# Patient Record
Sex: MALE | Race: WHITE | HISPANIC OR LATINO | Employment: FULL TIME | URBAN - METROPOLITAN AREA
[De-identification: names, ages, dates, MRNs, and addresses within clinical notes are randomized per-mention and may not be internally consistent; named-entity substitution may affect disease eponyms.]

---

## 2022-05-04 ASSESSMENT — ENCOUNTER SYMPTOMS
DIARRHEA: 0
FREQUENCY: 0
SORE THROAT: 0
HEMATURIA: 0
HEADACHES: 0
ABDOMINAL PAIN: 0
WEAKNESS: 0
CONSTIPATION: 0
EYE PAIN: 0
JOINT SWELLING: 0
SHORTNESS OF BREATH: 0
PARESTHESIAS: 0
MYALGIAS: 0
HEMATOCHEZIA: 0
DYSURIA: 0
PALPITATIONS: 0
ARTHRALGIAS: 0
DIZZINESS: 0
CHILLS: 0
COUGH: 0
HEARTBURN: 0
NERVOUS/ANXIOUS: 0
NAUSEA: 0
FEVER: 0

## 2022-05-05 ENCOUNTER — OFFICE VISIT (OUTPATIENT)
Dept: INTERNAL MEDICINE | Facility: CLINIC | Age: 42
End: 2022-05-05
Payer: COMMERCIAL

## 2022-05-05 VITALS
HEIGHT: 67 IN | RESPIRATION RATE: 20 BRPM | TEMPERATURE: 98.2 F | OXYGEN SATURATION: 97 % | DIASTOLIC BLOOD PRESSURE: 79 MMHG | HEART RATE: 74 BPM | BODY MASS INDEX: 38.92 KG/M2 | SYSTOLIC BLOOD PRESSURE: 114 MMHG | WEIGHT: 248 LBS

## 2022-05-05 DIAGNOSIS — Z11.4 SCREENING FOR HIV (HUMAN IMMUNODEFICIENCY VIRUS): ICD-10-CM

## 2022-05-05 DIAGNOSIS — Z23 NEED FOR PROPHYLACTIC VACCINATION AGAINST DIPHTHERIA AND TETANUS: ICD-10-CM

## 2022-05-05 DIAGNOSIS — Z13.220 SCREENING FOR HYPERLIPIDEMIA: ICD-10-CM

## 2022-05-05 DIAGNOSIS — Z11.59 NEED FOR HEPATITIS C SCREENING TEST: ICD-10-CM

## 2022-05-05 DIAGNOSIS — Z00.00 ANNUAL PHYSICAL EXAM: Primary | ICD-10-CM

## 2022-05-05 LAB
ANION GAP SERPL CALCULATED.3IONS-SCNC: 6 MMOL/L (ref 3–14)
BASOPHILS # BLD AUTO: 0 10E3/UL (ref 0–0.2)
BASOPHILS NFR BLD AUTO: 0 %
BUN SERPL-MCNC: 10 MG/DL (ref 7–30)
CALCIUM SERPL-MCNC: 9.2 MG/DL (ref 8.5–10.1)
CHLORIDE BLD-SCNC: 107 MMOL/L (ref 94–109)
CHOLEST SERPL-MCNC: 148 MG/DL
CO2 SERPL-SCNC: 27 MMOL/L (ref 20–32)
CREAT SERPL-MCNC: 1 MG/DL (ref 0.66–1.25)
EOSINOPHIL # BLD AUTO: 0.2 10E3/UL (ref 0–0.7)
EOSINOPHIL NFR BLD AUTO: 3 %
ERYTHROCYTE [DISTWIDTH] IN BLOOD BY AUTOMATED COUNT: 14.4 % (ref 10–15)
FASTING STATUS PATIENT QL REPORTED: YES
GFR SERPL CREATININE-BSD FRML MDRD: >90 ML/MIN/1.73M2
GLUCOSE BLD-MCNC: 100 MG/DL (ref 70–99)
HCT VFR BLD AUTO: 48.6 % (ref 40–53)
HDLC SERPL-MCNC: 21 MG/DL
HGB BLD-MCNC: 16.5 G/DL (ref 13.3–17.7)
LDLC SERPL CALC-MCNC: 88 MG/DL
LYMPHOCYTES # BLD AUTO: 2.1 10E3/UL (ref 0.8–5.3)
LYMPHOCYTES NFR BLD AUTO: 27 %
MCH RBC QN AUTO: 30 PG (ref 26.5–33)
MCHC RBC AUTO-ENTMCNC: 34 G/DL (ref 31.5–36.5)
MCV RBC AUTO: 88 FL (ref 78–100)
MONOCYTES # BLD AUTO: 0.6 10E3/UL (ref 0–1.3)
MONOCYTES NFR BLD AUTO: 8 %
NEUTROPHILS # BLD AUTO: 4.8 10E3/UL (ref 1.6–8.3)
NEUTROPHILS NFR BLD AUTO: 63 %
NONHDLC SERPL-MCNC: 127 MG/DL
PLATELET # BLD AUTO: 302 10E3/UL (ref 150–450)
POTASSIUM BLD-SCNC: 4.3 MMOL/L (ref 3.4–5.3)
RBC # BLD AUTO: 5.5 10E6/UL (ref 4.4–5.9)
SODIUM SERPL-SCNC: 140 MMOL/L (ref 133–144)
TRIGL SERPL-MCNC: 195 MG/DL
WBC # BLD AUTO: 7.7 10E3/UL (ref 4–11)

## 2022-05-05 PROCEDURE — 99386 PREV VISIT NEW AGE 40-64: CPT | Mod: 25 | Performed by: INTERNAL MEDICINE

## 2022-05-05 PROCEDURE — 80048 BASIC METABOLIC PNL TOTAL CA: CPT | Performed by: INTERNAL MEDICINE

## 2022-05-05 PROCEDURE — 85025 COMPLETE CBC W/AUTO DIFF WBC: CPT | Performed by: INTERNAL MEDICINE

## 2022-05-05 PROCEDURE — 90471 IMMUNIZATION ADMIN: CPT | Performed by: INTERNAL MEDICINE

## 2022-05-05 PROCEDURE — 36415 COLL VENOUS BLD VENIPUNCTURE: CPT | Performed by: INTERNAL MEDICINE

## 2022-05-05 PROCEDURE — 87389 HIV-1 AG W/HIV-1&-2 AB AG IA: CPT | Performed by: INTERNAL MEDICINE

## 2022-05-05 PROCEDURE — 86803 HEPATITIS C AB TEST: CPT | Performed by: INTERNAL MEDICINE

## 2022-05-05 PROCEDURE — 90715 TDAP VACCINE 7 YRS/> IM: CPT | Performed by: INTERNAL MEDICINE

## 2022-05-05 PROCEDURE — 80061 LIPID PANEL: CPT | Performed by: INTERNAL MEDICINE

## 2022-05-05 ASSESSMENT — ENCOUNTER SYMPTOMS
DYSURIA: 0
ARTHRALGIAS: 0
CONSTIPATION: 0
NERVOUS/ANXIOUS: 0
ABDOMINAL PAIN: 0
FEVER: 0
HEADACHES: 0
PARESTHESIAS: 0
WEAKNESS: 0
PALPITATIONS: 0
DIARRHEA: 0
HEARTBURN: 0
SHORTNESS OF BREATH: 0
HEMATURIA: 0
COUGH: 0
DIZZINESS: 0
FREQUENCY: 0
JOINT SWELLING: 0
CHILLS: 0
SORE THROAT: 0
NAUSEA: 0
EYE PAIN: 0
HEMATOCHEZIA: 0
MYALGIAS: 0

## 2022-05-05 ASSESSMENT — ANXIETY QUESTIONNAIRES
2. NOT BEING ABLE TO STOP OR CONTROL WORRYING: NOT AT ALL
GAD7 TOTAL SCORE: 0
1. FEELING NERVOUS, ANXIOUS, OR ON EDGE: NOT AT ALL
4. TROUBLE RELAXING: NOT AT ALL
7. FEELING AFRAID AS IF SOMETHING AWFUL MIGHT HAPPEN: NOT AT ALL
GAD7 TOTAL SCORE: 0
8. IF YOU CHECKED OFF ANY PROBLEMS, HOW DIFFICULT HAVE THESE MADE IT FOR YOU TO DO YOUR WORK, TAKE CARE OF THINGS AT HOME, OR GET ALONG WITH OTHER PEOPLE?: NOT DIFFICULT AT ALL
5. BEING SO RESTLESS THAT IT IS HARD TO SIT STILL: NOT AT ALL
GAD7 TOTAL SCORE: 0
7. FEELING AFRAID AS IF SOMETHING AWFUL MIGHT HAPPEN: NOT AT ALL
3. WORRYING TOO MUCH ABOUT DIFFERENT THINGS: NOT AT ALL
6. BECOMING EASILY ANNOYED OR IRRITABLE: NOT AT ALL

## 2022-05-05 ASSESSMENT — PATIENT HEALTH QUESTIONNAIRE - PHQ9
SUM OF ALL RESPONSES TO PHQ QUESTIONS 1-9: 0
10. IF YOU CHECKED OFF ANY PROBLEMS, HOW DIFFICULT HAVE THESE PROBLEMS MADE IT FOR YOU TO DO YOUR WORK, TAKE CARE OF THINGS AT HOME, OR GET ALONG WITH OTHER PEOPLE: NOT DIFFICULT AT ALL
SUM OF ALL RESPONSES TO PHQ QUESTIONS 1-9: 0

## 2022-05-05 NOTE — PATIENT INSTRUCTIONS
Preventive Health Recommendations  See your health care provider every year to  Review health changes.   Discuss preventive care.    Review your medicines if your doctor has prescribed any.  Talk with your health care provider about whether you should have a test to screen for prostate cancer (PSA).  Every 3 years, have a diabetes test (fasting glucose). If you are at risk for diabetes, you should have this test more often.  Every 5 years, have a cholesterol test. Have this test more often if you are at risk for high cholesterol or heart disease.   Every 10 years, have a colonoscopy. Or, have a yearly FIT test (stool test). These exams will check for colon cancer.  Talk to with your health care provider about screening for Abdominal Aortic Aneurysm if you have a family history of AAA or have a history of smoking.    Shots:   Get a flu shot each year.   Get a tetanus shot every 10 years.   Talk to your doctor about your pneumonia vaccines. There are now two you should receive - Pneumovax (PPSV 23) and Prevnar (PCV 13).  Talk to your pharmacist about a shingles vaccine.   Talk to your doctor about the hepatitis B vaccine.    Nutrition:   Eat at least 5 servings of fruits and vegetables each day.   Eat whole-grain bread, whole-wheat pasta and brown rice instead of white grains and rice.   Get adequate Calcium and Vitamin D.     Lifestyle  Exercise for at least 150 minutes a week (30 minutes a day, 5 days a week). This will help you control your weight and prevent disease.   Limit alcohol to one drink per day.   No smoking.   Wear sunscreen to prevent skin cancer.   See your dentist every six months for an exam and cleaning.   See your eye doctor every 1 to 2 years to screen for conditions such as glaucoma, macular degeneration and cataracts.    Personalized Prevention Plan  You are due for the preventive services outlined below.  Your care team is available to assist you in scheduling these services.  If you have  already completed any of these items, please share that information with your care team to update in your medical record.  Health Maintenance   Topic Date Due    ANNUAL REVIEW OF HM ORDERS  Never done    HIV SCREENING  Never done    HEPATITIS C SCREENING  Never done    DTAP/TDAP/TD IMMUNIZATION (1 - Tdap) 10/17/2013    PREVENTIVE CARE VISIT  10/17/2014    LIPID  10/21/2018    ADVANCE CARE PLANNING  05/05/2027    PHQ-2 (once per calendar year)  Completed    INFLUENZA VACCINE  Completed    COVID-19 Vaccine  Completed    Pneumococcal Vaccine: Pediatrics (0 to 5 Years) and At-Risk Patients (6 to 64 Years)  Aged Out    IPV IMMUNIZATION  Aged Out    MENINGITIS IMMUNIZATION  Aged Out    HEPATITIS B IMMUNIZATION  Aged Out

## 2022-05-05 NOTE — PROGRESS NOTES
SUBJECTIVE:   CC: Rick Collado is an 41 year old male who presents for preventative health visit.       Patient has been advised of split billing requirements and indicates understanding: Yes  Patient is a 41-year-old  male who presents to the clinic for his annual physical.  He has no acute concerns or complaints.  Patient reports a stable appetite.  He is stooling and voiding without issue.  Patient is fasting for labs.  He does not currently take any medications.  Patient does report a longstanding history of snoring, and he is scheduled to be seen by a sleep specialist within the next month.  Otherwise, he is doing well at this time.    Healthy Habits:     Getting at least 3 servings of Calcium per day:  Yes    Bi-annual eye exam:  Yes    Dental care twice a year:  Yes    Sleep apnea or symptoms of sleep apnea:  Sleep apnea    Diet:  Regular (no restrictions)    Frequency of exercise:  None    Taking medications regularly:  Yes    Medication side effects:  None    PHQ-2 Total Score: 1    Additional concerns today:  No    Ability to successfully perform activities of daily living: Yes, no assistance needed  Home safety:  none identified   Hearing impairment: No            Today's PHQ-2 Score:   PHQ-2 ( 1999 [employer]) 5/4/2022   Q1: Little interest or pleasure in doing things 1   Q2: Feeling down, depressed or hopeless 0   PHQ-2 Score 1   Q1: Little interest or pleasure in doing things Several days   Q2: Feeling down, depressed or hopeless Not at all   PHQ-2 Score 1       Abuse: Current or Past(Physical, Sexual or Emotional)- No  Do you feel safe in your environment? Yes    Have you ever done Advance Care Planning? (For example, a Health Directive, POLST, or a discussion with a medical provider or your loved ones about your wishes): No, advance care planning information given to patient to review.  Advanced care planning was discussed at today's visit.    Social History     Tobacco Use     Smoking  "status: Never Smoker     Smokeless tobacco: Not on file   Substance Use Topics     Alcohol use: Yes     Comment: occasionally         Alcohol Use 5/4/2022   Prescreen: >3 drinks/day or >7 drinks/week? No   Prescreen: >3 drinks/day or >7 drinks/week? -       Last PSA: No results found for: PSA    Reviewed orders with patient. Reviewed health maintenance and updated orders accordingly - Yes  Lab work is in process    Reviewed and updated as needed this visit by clinical staff                    Reviewed and updated as needed this visit by Provider                       Review of Systems   Constitutional: Negative for chills and fever.   HENT: Negative for congestion, ear pain, hearing loss and sore throat.    Eyes: Negative for pain and visual disturbance.   Respiratory: Negative for cough and shortness of breath.    Cardiovascular: Negative for chest pain, palpitations and peripheral edema.   Gastrointestinal: Negative for abdominal pain, constipation, diarrhea, heartburn, hematochezia and nausea.   Genitourinary: Negative for dysuria, frequency, genital sores, hematuria, impotence, penile discharge and urgency.   Musculoskeletal: Negative for arthralgias, joint swelling and myalgias.   Skin: Negative for rash.   Neurological: Negative for dizziness, weakness, headaches and paresthesias.   Psychiatric/Behavioral: Negative for mood changes. The patient is not nervous/anxious.        OBJECTIVE:   Blood pressure 114/79, pulse 74, temperature 98.2  F (36.8  C), resp. rate 20, height 1.702 m (5' 7\"), weight 112.5 kg (248 lb), SpO2 97 %.        Physical Exam  Vitals and nursing note reviewed.   Constitutional:       Appearance: Normal appearance. He is normal weight.   HENT:      Head: Normocephalic and atraumatic.      Right Ear: Tympanic membrane, ear canal and external ear normal.      Left Ear: Tympanic membrane, ear canal and external ear normal.      Mouth/Throat:      Mouth: Mucous membranes are moist.      Pharynx: " Oropharynx is clear.   Eyes:      Extraocular Movements: Extraocular movements intact.      Conjunctiva/sclera: Conjunctivae normal.      Pupils: Pupils are equal, round, and reactive to light.   Cardiovascular:      Rate and Rhythm: Normal rate and regular rhythm.      Pulses: Normal pulses.      Heart sounds: Normal heart sounds.   Pulmonary:      Effort: Pulmonary effort is normal.      Breath sounds: Normal breath sounds.   Abdominal:      General: Abdomen is flat. Bowel sounds are normal.      Palpations: Abdomen is soft.   Musculoskeletal:      Cervical back: Normal range of motion and neck supple.   Skin:     General: Skin is warm.      Capillary Refill: Capillary refill takes less than 2 seconds.   Neurological:      General: No focal deficit present.      Mental Status: He is alert and oriented to person, place, and time. Mental status is at baseline.         Diagnostic Test Results: BMP, CBC, hepatitis C, and HIV screening are pending.    ASSESSMENT/PLAN:   (Z00.00) Annual physical exam  (primary encounter diagnosis)  Comment: At this time, patient does have a relatively unremarkable physical examination.  His blood pressure is noted to be in acceptable level.  We did spend some time discussing appropriate dietary and lifestyle modification necessary to help keep his weight and blood pressure under good control.  Fasting labs are pending.  Plan: Basic metabolic panel  (Ca, Cl, CO2, Creat,         Gluc, K, Na, BUN), CBC with platelets and         differential    (Z11.4) Screening for HIV (human immunodeficiency virus)  Comment: HIV Antigen Antibody Combo is pending.    (Z11.59) Need for hepatitis C screening test  Comment: Hepatitis C Screen Reflex to HCV RNA Quant and         Genotype is pending.    (Z13.220) Screening for hyperlipidemia  Comment: Lipid panel reflex to direct LDL Fasting is pending.    (Z23) Need for prophylactic vaccination against diphtheria and tetanus  Comment: Tdap immunization  "administered    Patient has been advised of split billing requirements and indicates understanding: Yes    COUNSELING:   Reviewed preventive health counseling, as reflected in patient instructions    Estimated body mass index is 35.07 kg/m  as calculated from the following:    Height as of 10/30/13: 1.702 m (5' 7.01\").    Weight as of 10/30/13: 101.6 kg (224 lb).     Weight management plan: Discussed healthy diet and exercise guidelines    He reports that he has never smoked. He does not have any smokeless tobacco history on file.      Counseling Resources:  ATP IV Guidelines  Pooled Cohorts Equation Calculator  FRAX Risk Assessment  ICSI Preventive Guidelines  Dietary Guidelines for Americans, 2010  USDA's MyPlate  ASA Prophylaxis  Lung CA Screening    Geo Ayala MD  [facility]  Answers for HPI/ROS submitted by the patient on 5/5/2022  If you checked off any problems, how difficult have these problems made it for you to do your work, take care of things at home, or get along with other people?: Not difficult at all  PHQ9 TOTAL SCORE: 0  RADHA 7 TOTAL SCORE: 0      "

## 2022-05-06 LAB
HCV AB SERPL QL IA: NONREACTIVE
HIV 1+2 AB+HIV1 P24 AG SERPL QL IA: NONREACTIVE

## 2022-05-06 ASSESSMENT — PATIENT HEALTH QUESTIONNAIRE - PHQ9: SUM OF ALL RESPONSES TO PHQ QUESTIONS 1-9: 0

## 2022-05-06 ASSESSMENT — ANXIETY QUESTIONNAIRES: GAD7 TOTAL SCORE: 0

## 2022-06-17 ENCOUNTER — OFFICE VISIT (OUTPATIENT)
Dept: INTERNAL MEDICINE | Facility: CLINIC | Age: 42
End: 2022-06-17
Payer: COMMERCIAL

## 2022-06-17 VITALS
RESPIRATION RATE: 20 BRPM | WEIGHT: 239 LBS | HEART RATE: 74 BPM | DIASTOLIC BLOOD PRESSURE: 79 MMHG | TEMPERATURE: 98.2 F | OXYGEN SATURATION: 98 % | BODY MASS INDEX: 37.51 KG/M2 | SYSTOLIC BLOOD PRESSURE: 123 MMHG | HEIGHT: 67 IN

## 2022-06-17 DIAGNOSIS — R73.9 BLOOD GLUCOSE ELEVATED: Primary | ICD-10-CM

## 2022-06-17 LAB — HBA1C MFR BLD: 5.6 % (ref 0–5.6)

## 2022-06-17 PROCEDURE — 99213 OFFICE O/P EST LOW 20 MIN: CPT | Performed by: INTERNAL MEDICINE

## 2022-06-17 PROCEDURE — 83036 HEMOGLOBIN GLYCOSYLATED A1C: CPT | Performed by: INTERNAL MEDICINE

## 2022-06-17 PROCEDURE — 36415 COLL VENOUS BLD VENIPUNCTURE: CPT | Performed by: INTERNAL MEDICINE

## 2022-06-17 ASSESSMENT — ENCOUNTER SYMPTOMS
GASTROINTESTINAL NEGATIVE: 1
CARDIOVASCULAR NEGATIVE: 1
RESPIRATORY NEGATIVE: 1
ENDOCRINE NEGATIVE: 1
CONSTITUTIONAL NEGATIVE: 1

## 2022-06-17 NOTE — PROGRESS NOTES
Assessment & Plan     Blood glucose elevated  At this time, patient was noted to have a fasting glucose level of 100 at his recent annual physical.  Patient states that his family is concerned about the possibility of diabetes based on his recent lab test at his physical.  We did spend some time reviewing all of his recent blood work.  Patient had all questions answered, but he did wish to proceed with a hemoglobin A1c to ensure that there are no underlying issues with his blood glucose levels.  His A1c is currently pending.  We will contact him with results once they are available for review.  - Hemoglobin A1c; Future    Ordering of each unique test  20 minutes spent on the date of the encounter doing chart review, history and exam, documentation and further activities per the note       See Patient Instructions    Return in about 1 year (around 6/17/2023) for Routine preventive.    Geo Ayala MD  [facility] is a 41 year old, presenting for the following health issues: follow up on lab results.    Follow Up      Patient is a 41-year-old  male who presents to the clinic with questions about his recent lab work.  Patient did have an annual physical performed on May 5, 2022.  At that time, he did have fasting lab work.  Patient reports that he did have concerns about his blood glucose level.  He was noted to have a fasting glucose level of 100.  Patient did review the results with his significant other, who is a nurse.  He states that she is very concerned about his blood glucose level.  He also had a cholesterol panel collected.  His total cholesterol was noted to be148 with an LDL level of 88.  His main concern today is in regards to his blood sugar.  He does not report a family history of diabetes.  Patient is also not reporting any symptoms suggestive of elevated blood glucose levels such as polyuria, polydipsia, or polyphagia.         Review of  "Systems   Constitutional: Negative.    HENT: Negative.    Respiratory: Negative.    Cardiovascular: Negative.    Gastrointestinal: Negative.    Endocrine: Negative.             Objective    Blood pressure 123/79, pulse 74, temperature 98.2  F (36.8  C), resp. rate 20, height 1.702 m (5' 7\"), weight 108.4 kg (239 lb), SpO2 98 %.      Physical Exam  Vitals and nursing note reviewed.   Constitutional:       General: He is not in acute distress.     Appearance: He is well-developed.   HENT:      Head: Normocephalic and atraumatic.      Right Ear: Tympanic membrane and external ear normal.      Left Ear: Tympanic membrane and external ear normal.      Nose: Nose normal.      Mouth/Throat:      Mouth: Mucous membranes are moist. No oral lesions.      Pharynx: Oropharynx is clear. No oropharyngeal exudate.   Eyes:      General:         Right eye: No discharge.         Left eye: No discharge.      Conjunctiva/sclera: Conjunctivae normal.      Pupils: Pupils are equal, round, and reactive to light.   Neck:      Thyroid: No thyromegaly.      Trachea: No tracheal deviation.   Cardiovascular:      Rate and Rhythm: Normal rate and regular rhythm.      Pulses: Normal pulses.      Heart sounds: Normal heart sounds, S1 normal and S2 normal. No murmur heard.    No S3 or S4 sounds.   Pulmonary:      Effort: Pulmonary effort is normal. No respiratory distress.      Breath sounds: Normal breath sounds. No wheezing or rales.   Abdominal:      General: Bowel sounds are normal.      Palpations: Abdomen is soft. There is no mass.      Tenderness: There is no abdominal tenderness.   Musculoskeletal:      Cervical back: Neck supple.   Lymphadenopathy:      Cervical: No cervical adenopathy.   Neurological:      Mental Status: He is alert.      Motor: No abnormal muscle tone.      Deep Tendon Reflexes: Reflexes are normal and symmetric.   Psychiatric:         Speech: Speech normal.        Diagnostic Test Results: Hemoglobin A1c is " pending.        .  ..

## 2022-07-06 ENCOUNTER — OFFICE VISIT (OUTPATIENT)
Dept: SLEEP MEDICINE | Facility: CLINIC | Age: 42
End: 2022-07-06
Payer: COMMERCIAL

## 2022-07-06 VITALS
RESPIRATION RATE: 16 BRPM | OXYGEN SATURATION: 99 % | DIASTOLIC BLOOD PRESSURE: 68 MMHG | WEIGHT: 234.8 LBS | SYSTOLIC BLOOD PRESSURE: 125 MMHG | BODY MASS INDEX: 36.85 KG/M2 | HEART RATE: 78 BPM | HEIGHT: 67 IN

## 2022-07-06 DIAGNOSIS — R06.83 SNORING: ICD-10-CM

## 2022-07-06 DIAGNOSIS — R06.81 WITNESSED APNEIC SPELLS: ICD-10-CM

## 2022-07-06 DIAGNOSIS — R29.818 SUSPECTED SLEEP APNEA: Primary | ICD-10-CM

## 2022-07-06 PROCEDURE — 99203 OFFICE O/P NEW LOW 30 MIN: CPT | Performed by: INTERNAL MEDICINE

## 2022-07-06 RX ORDER — CETIRIZINE HYDROCHLORIDE 10 MG/1
10 TABLET ORAL DAILY
COMMUNITY
End: 2022-12-23

## 2022-07-06 NOTE — NURSING NOTE
"Chief Complaint   Patient presents with     Sleep Problem     Snoring and partner witnessed apnea       Initial /68   Pulse 78   Resp 16   Ht 1.702 m (5' 7\")   Wt 106.5 kg (234 lb 12.8 oz)   SpO2 99%   BMI 36.77 kg/m   Estimated body mass index is 36.77 kg/m  as calculated from the following:    Height as of this encounter: 1.702 m (5' 7\").    Weight as of this encounter: 106.5 kg (234 lb 12.8 oz).    Medication Reconciliation: complete   ESS: 3  Neck circumference: 45 centimeters.  DME: N/A  Cassie Cárdenas CMA      "

## 2022-07-06 NOTE — NURSING NOTE
WatchPAT [facility] pick-up and drop-off scheduled.  Follow-up appointment with provider also scheduled. Gave pt. AVS and directions for WatchPAT pick-up in [place]. Cassie Cárdenas, CHRISTOPHE

## 2022-07-06 NOTE — PROGRESS NOTES
Sleep Consultation:    Date on this visit: 7/6/2022    Rick Collado  is referred by No ref. provider found for a sleep consultation.     Primary Physician: Geo Ayala     Rick Collado reports nightly snoring and frequent witnessed apneas for many years.     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     Rick does snore loudly every night. His ex wife and current girlfriend has made him aware of apneas in his sleep. Patient has occasional gasping episodes in his sleep. Patient sleeps on his back and side. He has frequent morning dry mouth, denies no morning headaches and restless legs. Rick denies any sleep walking, dream enactment, sleep paralysis and hypnogogic/hypnopompic hallucinations.    Rick goes to sleep at 9:30 PM during the week. He wakes up at 4:00 AM. He falls asleep in 20 minutes.  Rick denies difficulty falling asleep.  He wakes up 1-2 times a night for 10 minutes before falling back to sleep.  Rick wakes up to use the bathroom.  On weekends, Rick goes to sleep at 10:30 PM.  He wakes up at 6:00 AM. He falls asleep in 20 minutes.  Patient gets an average of 5- 6 hours of sleep per night.     Rick denies difficulty breathing through his nose.      Patient's Harvey Sleepiness score 3/24 consistent with no daytime sleepiness.      Rick naps 1-2 times per week for 20-30 minutes, feels refreshed after naps. He takes no inadvertant naps.  He denies closing eyes, dozing and falling asleep while driving.  Patient was counseled on the importance of driving while alert, to pull over if drowsy, or nap before getting into the vehicle if sleepy.      He uses 1 cups/day of coffee. Last caffeine intake is usually before noon.    Allergies:    No Known Allergies    Medications:    Current Outpatient Medications   Medication Sig Dispense Refill     cetirizine (ZYRTEC) 10 MG tablet Take 10 mg by mouth daily       naproxen (NAPROSYN) 500  "MG tablet Take by mouth daily as needed.          Problem List:  Patient Active Problem List    Diagnosis Date Noted     CARDIOVASCULAR SCREENING; LDL GOAL LESS THAN 160      Priority: Medium        Past Medical/Surgical History:  Past Medical History:   Diagnosis Date     CARDIOVASCULAR SCREENING; LDL GOAL LESS THAN 160      Past Surgical History:   Procedure Laterality Date     NO HISTORY OF SURGERY         Social History     Tobacco Use     Smoking status: Never Smoker     Smokeless tobacco: Never Used   Substance Use Topics     Alcohol use: Yes     Comment: occasionally     Drug use: No       Physical Examination:  Vitals: /68   Pulse 78   Resp 16   Ht 1.702 m (5' 7\")   Wt 106.5 kg (234 lb 12.8 oz)   SpO2 99%   BMI 36.77 kg/m    BMI= Body mass index is 36.77 kg/m .  GENERAL APPEARANCE: healthy, alert and no distress  HENT: oropharynx crowded, tongue base enlarged and tonsillar hypertrophy  RESP: lungs clear to auscultation - no rales, rhonchi or wheezes  CV: regular rates and rhythm  NEURO: mentation intact and speech normal  PSYCH: mentation appears normal and affect normal/bright  Mallampati Class: IV.  Tonsillar Stage: 3  extending beyond pillars.    Impression/Plan:    1. Probable obstructive sleep apnea    Patient is a 41 years old male, with BMI of 36, who presents with history of frequent loud snoring & witnessed apnea. Oropharynx is crowded on examination. There is an intermediate to high risk for obstructive sleep apnea and an overnight sleep study is recommended for assessment.     Plan:     1. Home sleep apnea testing with Watch PAT        Literature provided regarding sleep apnea.      He will follow up with me in approximately two weeks after his sleep study has been competed to review the results and discuss plan of care.       Polysomnography & HST reviewed.  Limitations of HST reviewed.   Obstructive sleep apnea reviewed.  Complications of untreated sleep apnea were reviewed.    " Luis Eduardo Feldman       CC: No ref. provider found

## 2022-08-22 ENCOUNTER — OFFICE VISIT (OUTPATIENT)
Dept: SLEEP MEDICINE | Facility: CLINIC | Age: 42
End: 2022-08-22
Payer: COMMERCIAL

## 2022-08-22 DIAGNOSIS — R06.81 WITNESSED APNEIC SPELLS: ICD-10-CM

## 2022-08-22 DIAGNOSIS — G47.33 OSA (OBSTRUCTIVE SLEEP APNEA): ICD-10-CM

## 2022-08-22 DIAGNOSIS — R06.83 SNORING: ICD-10-CM

## 2022-08-22 DIAGNOSIS — R29.818 SUSPECTED SLEEP APNEA: ICD-10-CM

## 2022-08-22 PROCEDURE — 95800 SLP STDY UNATTENDED: CPT | Performed by: INTERNAL MEDICINE

## 2022-08-23 ENCOUNTER — DOCUMENTATION ONLY (OUTPATIENT)
Dept: SLEEP MEDICINE | Facility: CLINIC | Age: 42
End: 2022-08-23

## 2022-08-23 NOTE — PROGRESS NOTES
Watch Pat has been scored using rule 1B, 4%.  Patient to follow up with provider to determine appropriate therapy.    PAT AHI: 58.2    Ordering Provider: Luis Eduardo Feldman MD

## 2022-08-28 PROBLEM — G47.33 OSA (OBSTRUCTIVE SLEEP APNEA): Status: ACTIVE | Noted: 2022-08-28

## 2022-08-28 NOTE — PROCEDURES
"WatchPAT - HOME SLEEP STUDY INTERPRETATION    Patient: Rick Collado  MRN: [id number]  YOB: 1980  Study Date: 8/22/2022  Referring Provider: Geo Ayala;   Ordering Provider: Luis Eduardo Feldman MD, MD    Chain of custody patient verification was enabled.  Chain of custody verification was present throughout the entire study.     Indications for Home Study: Rick Collado is a 41 year old male who presents with symptoms suggestive of obstructive sleep apnea.    Estimated body mass index is 36.77 kg/m  as calculated from the following:    Height as of 7/6/22: 1.702 m (5' 7\").    Weight as of 7/6/22: 106.5 kg (234 lb 12.8 oz).  Total score - [place]: 3 (7/6/2022  2:00 PM)    Data: A full night home sleep study was performed recording the standard physiologic parameters including peripheral arterial tonometry (PAT), sound/snoring, body position,  movement, sound, and oxygen saturation by pulse oximetry. Pulse rate was estimated by oximetry recording. Sleep staging (wake, REM, light, and deep sleep) was derived from PAT signal.  This study was considered adequate based on > 4 hours of quality oximetry and respiratory recording. As specified by the AASM Manual for the Scoring of Sleep and Associated events, version 2.3, Rule VIII.D 1B, 4% oxygen desaturation scoring for hypopneas is used as a standard of care on all home sleep apnea testing.    Total Recording Time: 9 hrs, 25 min  Total Sleep Time: 7 hrs, 12 min  % of Sleep Time REM: 15.8%    Respiratory:  Snoring: Snoring was present.  Respiratory events: The PAT respiratory disturbance index [pRDI] was 58.4 events per hour.  The PAT apnea/hypopnea index [pAHI] was 58.2 events per hour.  HILLARY was 56.4 events per hour.  During REM sleep the pAHI was 9.  Sleep Associated Hypoxemia: sustained hypoxemia was present. Mean oxygen saturation was 92%.  Minimum was 78%.  Time with saturation less than 88% was 21.7 minutes.    Heart Rate: By pulse " oximetry normal rate was noted.     Position: Percent of time spent: supine - 76.2%, prone - 11.7%, on right - 4%, on left 8.1%.  pAHI was 70.9 per hour supine, 3.7 per hour prone, 33.8 per hour on right side, and N/A per hour on left side.     Assessment:   Severe obstructive sleep apnea.  Sleep associated hypoxemia was present.    Recommendations:  CPAP therapy is recommended for severe obstructive sleep apnea.  Consider auto-CPAP at 5-15 cmH2O with appropriate clinical follow-up to monitor therapy.  Suggest optimizing sleep hygiene and avoiding sleep deprivation.  Weight management.    Diagnosis Code(s): Obstructive Sleep Apnea G47.33, Hypoxemia G47.36    Luis Eduardo Feldman MD, August 28, 2022   Diplomate, American Board of Psychiatry and Neurology, Sleep Medicine

## 2022-09-06 ENCOUNTER — OFFICE VISIT (OUTPATIENT)
Dept: SLEEP MEDICINE | Facility: CLINIC | Age: 42
End: 2022-09-06
Payer: COMMERCIAL

## 2022-09-06 VITALS
BODY MASS INDEX: 38.53 KG/M2 | OXYGEN SATURATION: 96 % | DIASTOLIC BLOOD PRESSURE: 78 MMHG | WEIGHT: 246 LBS | SYSTOLIC BLOOD PRESSURE: 117 MMHG | HEART RATE: 85 BPM

## 2022-09-06 DIAGNOSIS — G47.33 OSA (OBSTRUCTIVE SLEEP APNEA): Primary | ICD-10-CM

## 2022-09-06 PROCEDURE — 99213 OFFICE O/P EST LOW 20 MIN: CPT | Performed by: INTERNAL MEDICINE

## 2022-09-06 NOTE — PROGRESS NOTES
Sleep Study Follow-Up Visit:    Date on this visit: 9/6/2022    Rick Collado comes in today for follow-up of his WatchPAT home sleep study done on 8/2/22 at the [facility] for possible sleep apnea.    Total Recording Time: 9 hrs, 25 min  Total Sleep Time: 7 hrs, 12 min  % of Sleep Time REM: 15.8%     Respiratory:  Snoring: Snoring was present.  Respiratory events: The PAT respiratory disturbance index [pRDI] was 58.4 events per hour.  The PAT apnea/hypopnea index [pAHI] was 58.2 events per hour.  HILLARY was 56.4 events per hour.  During REM sleep the pAHI was 9.  Sleep Associated Hypoxemia: sustained hypoxemia was present. Mean oxygen saturation was 92%.  Minimum was 78%.  Time with saturation less than 88% was 21.7 minutes.     Heart Rate: By pulse oximetry normal rate was noted.      Position: Percent of time spent: supine - 76.2%, prone - 11.7%, on right - 4%, on left 8.1%.  pAHI was 70.9 per hour supine, 3.7 per hour prone, 33.8 per hour on right side, and N/A per hour on left side.      Assessment:   Severe obstructive sleep apnea.  Sleep associated hypoxemia was present.    These findings were reviewed with patient.     Past medical/surgical history, family history, social history, medications and allergies were reviewed.      Problem List:  Patient Active Problem List    Diagnosis Date Noted     JACKSON (obstructive sleep apnea) 08/28/2022     Priority: Medium     Severe JACKSON       CARDIOVASCULAR SCREENING; LDL GOAL LESS THAN 160      Priority: Medium        Impression/Plan:    1.  Severe obstructive sleep apnea    Home sleep test result was reviewed in detail.  Patient was counseled regarding severe obstructive sleep apnea, consequences of untreated disease and management.  CPAP therapy is recommended for severe disease, which patient opts to start.  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    Plan:      1.  Start auto titrating CPAP therapy with a pressure range of 5 to 15 cm H2O  2.  Follow-up in 1 to 2 months after starting treatment      I spent a total of 20 minutes for this appointment on this date of service which include time spent before, during and after the visit for chart review, patient care, counseling and coordination of care.    Dr. Luis Eduardo Feldman     CC: Geo Ayala

## 2022-09-06 NOTE — NURSING NOTE
"Chief Complaint   Patient presents with     Study Results       Initial /78   Pulse 85   Wt 111.6 kg (246 lb)   SpO2 96%   BMI 38.53 kg/m   Estimated body mass index is 38.53 kg/m  as calculated from the following:    Height as of 7/6/22: 1.702 m (5' 7\").    Weight as of this encounter: 111.6 kg (246 lb).    Medication Reconciliation: complete  ESS:2    Simran Bullard CNA    "

## 2022-09-10 ENCOUNTER — HEALTH MAINTENANCE LETTER (OUTPATIENT)
Age: 42
End: 2022-09-10

## 2022-09-13 ENCOUNTER — TELEPHONE (OUTPATIENT)
Dept: SLEEP MEDICINE | Facility: CLINIC | Age: 42
End: 2022-09-13

## 2022-09-13 DIAGNOSIS — G47.33 OSA (OBSTRUCTIVE SLEEP APNEA): Primary | ICD-10-CM

## 2022-09-13 NOTE — TELEPHONE ENCOUNTER
Left message for patient to call [facility] s main line at [phone] to schedule CPAP setup appointment.

## 2022-11-17 ENCOUNTER — IMMUNIZATION (OUTPATIENT)
Dept: INTERNAL MEDICINE | Facility: CLINIC | Age: 42
End: 2022-11-17
Payer: COMMERCIAL

## 2022-11-17 DIAGNOSIS — Z23 NEED FOR PROPHYLACTIC VACCINATION AND INOCULATION AGAINST INFLUENZA: Primary | ICD-10-CM

## 2022-11-17 DIAGNOSIS — Z23 NEED FOR VACCINATION: ICD-10-CM

## 2022-11-17 DIAGNOSIS — Z23 HIGH PRIORITY FOR 2019-NCOV VACCINE: ICD-10-CM

## 2022-11-17 PROCEDURE — 99207 PR NO CHARGE NURSE ONLY: CPT

## 2022-11-17 PROCEDURE — 90686 IIV4 VACC NO PRSV 0.5 ML IM: CPT

## 2022-11-17 PROCEDURE — 90471 IMMUNIZATION ADMIN: CPT

## 2022-11-17 PROCEDURE — 91313 COVID-19,PF,MODERNA BIVALENT: CPT

## 2022-11-17 PROCEDURE — 0134A COVID-19,PF,MODERNA BIVALENT: CPT

## 2022-12-23 ENCOUNTER — OFFICE VISIT (OUTPATIENT)
Dept: INTERNAL MEDICINE | Facility: CLINIC | Age: 42
End: 2022-12-23
Payer: COMMERCIAL

## 2022-12-23 VITALS
OXYGEN SATURATION: 99 % | WEIGHT: 249 LBS | HEART RATE: 78 BPM | DIASTOLIC BLOOD PRESSURE: 81 MMHG | TEMPERATURE: 98.1 F | BODY MASS INDEX: 39.08 KG/M2 | HEIGHT: 67 IN | RESPIRATION RATE: 20 BRPM | SYSTOLIC BLOOD PRESSURE: 127 MMHG

## 2022-12-23 DIAGNOSIS — L25.9 CONTACT DERMATITIS, UNSPECIFIED CONTACT DERMATITIS TYPE, UNSPECIFIED TRIGGER: Primary | ICD-10-CM

## 2022-12-23 PROCEDURE — 99214 OFFICE O/P EST MOD 30 MIN: CPT | Performed by: INTERNAL MEDICINE

## 2022-12-23 RX ORDER — TRIAMCINOLONE ACETONIDE 1 MG/G
CREAM TOPICAL 2 TIMES DAILY
Qty: 45 G | Refills: 1 | Status: SHIPPED | OUTPATIENT
Start: 2022-12-23

## 2022-12-23 ASSESSMENT — ENCOUNTER SYMPTOMS
CARDIOVASCULAR NEGATIVE: 1
GASTROINTESTINAL NEGATIVE: 1
NEUROLOGICAL NEGATIVE: 1
MUSCULOSKELETAL NEGATIVE: 1
RESPIRATORY NEGATIVE: 1

## 2022-12-23 NOTE — PATIENT INSTRUCTIONS
Suspected contact dermatitis.  We will proceed with trial of triamcinolone cream 0.1% with instruction to do 1 application topically twice per day for the next few days.

## 2022-12-23 NOTE — PROGRESS NOTES
"  Assessment & Plan     Contact dermatitis, unspecified contact dermatitis type, unspecified trigger  At this time, patient is noted to have erythematous rash that is arranged in a well-defined ring around his neck.  There is some signs of excoriation noted.  Given the well-defined and limited area that the rash is present in, I do have a suspicion for contact dermatitis.  At this time, we did elect to proceed with a trial of triamcinolone 0.1% cream with instruction to apply to the affected area twice per day for the next 7 days to see if this will help resolve his rash.  Patient was encouraged to monitor for any exposures that exacerbates his rash.  I did recommend that he also utilize a hypoallergenic, moisturizing cream, such as Aquaphor, to help relieve his itching.  We will monitor his response to treatment  - triamcinolone (KENALOG) 0.1 % external cream; Apply topically 2 times daily    Prescription drug management  30 minutes spent on the date of the encounter doing chart review, history and exam, documentation and further activities per the note       BMI:   Estimated body mass index is 39 kg/m  as calculated from the following:    Height as of this encounter: 1.702 m (5' 7\").    Weight as of this encounter: 112.9 kg (249 lb).       See Patient Instructions    Return in about 2 weeks (around 1/6/2023), or if symptoms worsen or fail to improve.    Geo Ayala MD  [facility] JENS Cabrera is a 42 year old, presenting for the following health issues:  Derm Problem      Patient is a 42-year-old male who presents to the clinic with a chief complaint of a rash.  He reports that for the last 3 months he has been dealing with a persistent rash that is located in a ring around his neck.  He describes the rash as being very red, and it can be extremely itchy.  Patient has applied a variety of moisturizers and lotions to the affected area.  Some of the moisturizers will " "provide him with some relief from the itching, but nothing is helped him resolve the rash.  Patient is not aware of any known, new exposures.  The rash is strictly located around his neck.  Patient does live alone, but he does state that his girlfriend and her children do not have any skin findings of note at this time.  Patient is not currently reporting any other symptoms.       Rash  Onset/Duration: about 3 months  Description  Location: neck  Character: blotchy, raised, red  Itching: mild  Intensity:  mild  Progression of Symptoms:  worsening  Accompanying signs and symptoms:   Fever: No  Body aches or joint pain: No  Sore throat symptoms: No  Recent cold symptoms: No  History:           Previous episodes of similar rash: None  New exposures:  None  Recent travel: No  Exposure to similar rash: No  Precipitating or alleviating factors:   Therapies tried and outcome: hydrocortisone cream -  sometimes        Review of Systems   HENT: Negative.    Respiratory: Negative.    Cardiovascular: Negative.    Gastrointestinal: Negative.    Musculoskeletal: Negative.    Skin: Positive for rash.   Neurological: Negative.           Objective    Blood pressure 127/81, pulse 78, temperature 98.1  F (36.7  C), resp. rate 20, height 1.702 m (5' 7\"), weight 112.9 kg (249 lb), SpO2 99 %.      Physical Exam  Vitals reviewed.   Constitutional:       Appearance: Normal appearance.   HENT:      Head: Normocephalic and atraumatic.      Right Ear: Tympanic membrane, ear canal and external ear normal.      Left Ear: Tympanic membrane, ear canal and external ear normal.      Mouth/Throat:      Mouth: Mucous membranes are moist.      Pharynx: Oropharynx is clear.   Eyes:      Extraocular Movements: Extraocular movements intact.      Conjunctiva/sclera: Conjunctivae normal.      Pupils: Pupils are equal, round, and reactive to light.   Cardiovascular:      Rate and Rhythm: Regular rhythm.      Pulses: Normal pulses.      Heart sounds: Normal " heart sounds.   Pulmonary:      Effort: Pulmonary effort is normal.      Breath sounds: Normal breath sounds.   Musculoskeletal:      Cervical back: Normal range of motion and neck supple.   Skin:     General: Skin is warm and dry.      Capillary Refill: Capillary refill takes less than 2 seconds.      Findings: Rash (Patient has a dry, flaky, erythematous rash located in a well-defined ring around his neck.  No pustules or vesicles noted.  Some signs of excoriation but appreciated.) present.   Neurological:      Mental Status: He is alert.

## 2023-04-20 ENCOUNTER — PATIENT OUTREACH (OUTPATIENT)
Dept: CARE COORDINATION | Facility: CLINIC | Age: 43
End: 2023-04-20
Payer: COMMERCIAL

## 2023-05-04 ENCOUNTER — PATIENT OUTREACH (OUTPATIENT)
Dept: CARE COORDINATION | Facility: CLINIC | Age: 43
End: 2023-05-04
Payer: COMMERCIAL

## 2023-07-23 ENCOUNTER — HEALTH MAINTENANCE LETTER (OUTPATIENT)
Age: 43
End: 2023-07-23

## 2024-09-15 ENCOUNTER — HEALTH MAINTENANCE LETTER (OUTPATIENT)
Age: 44
End: 2024-09-15

## 2025-04-23 ENCOUNTER — OFFICE VISIT (OUTPATIENT)
Dept: URGENT CARE | Facility: URGENT CARE | Age: 45
End: 2025-04-23

## 2025-04-23 VITALS
SYSTOLIC BLOOD PRESSURE: 118 MMHG | WEIGHT: 220 LBS | HEART RATE: 72 BPM | OXYGEN SATURATION: 99 % | RESPIRATION RATE: 16 BRPM | HEIGHT: 70 IN | TEMPERATURE: 98 F | BODY MASS INDEX: 31.5 KG/M2 | DIASTOLIC BLOOD PRESSURE: 72 MMHG

## 2025-04-23 DIAGNOSIS — A08.4 VIRAL GASTROENTERITIS: Primary | ICD-10-CM

## 2025-04-23 PROCEDURE — 99213 OFFICE O/P EST LOW 20 MIN: CPT | Performed by: FAMILY MEDICINE

## 2025-04-23 NOTE — PROGRESS NOTES
Urgent Care Clinic Visit    Chief Complaint   Patient presents with    Urgent Care     X 1 day ago, is a - has a new employer, [employer], vomiting and diarrhea, went to work even though he didn't feel good, has some burning in chest like abdominal pain- acid reflux x 3 days. Took ibuprofen and allergy medications- seasonal. Needs a letter for work.                4/23/2025     4:43 PM   Additional Questions   Roomed by Ely JOHNS

## 2025-04-23 NOTE — LETTER
2025    Rikc CORRALES Collado   1980    To Whom it May Concern;    Rick was seen in our urgent care clinic today and was diagnosed with acute gastroenteritis, a very contagious viral illness.  He should stay at home until he has been free of symptoms for a full 24 hours.  His illness could last up to 5 days.    Please excuse his absence from work during this time.    Sincerely,        Sujatha Alonzo MD

## 2025-04-23 NOTE — PATIENT INSTRUCTIONS
For acid reflux/heartburn pain, try using famotidine 20 mg twice daily for up to the next 2 weeks to help.  If it doesn't resolve within a few days, please make an appointment to talk about this with your primary care provider.    You can return to work once the diarrhea has been gone for a full 24-hour period.    Continue to drink plenty of fluids and advance your diet as tolerated.

## 2025-04-23 NOTE — PROGRESS NOTES
"  ICD-10-CM    1. Viral gastroenteritis  A08.4         Pt remains adequately hydrated at this time and should be able to maintain hydration orally at this point.  No signs on exam today of serious intra-abdominal complications.     PLAN:  Patient Instructions   For acid reflux/heartburn pain, try using famotidine 20 mg twice daily for up to the next 2 weeks to help.  If it doesn't resolve within a few days, please make an appointment to talk about this with your primary care provider.    You can return to work once the diarrhea has been gone for a full 24-hour period.    Continue to drink plenty of fluids and advance your diet as tolerated.    SUBJECTIVE:  Rick Collado is a 44 year old male who presents to  today with sudden onset of vomiting and diarrhea in the middle of the night last night.  Since then he has had at least 10 nonbloody stools.  He has also had a mild headache and has been feeling fatigued.  No sick contacts in the last two weeks that he knows of.  No fever.  He is noticing a lot of heartburn in his upper abdomen/lower chest.  Hasn't taken anything for this.    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    OBJECTIVE:  /72   Pulse 72   Temp 98  F (36.7  C)   Resp 16   Ht 1.778 m (5' 10\")   Wt 99.8 kg (220 lb)   SpO2 99%   BMI 31.57 kg/m    GEN: well-appearing, in NAD  ENT: oral MMM  Lungs:  CTAB  CV:  RRR, no murmurs  Abd:  soft, bowel sounds present, not distended, no guarding or rebound, mild tenderness epigastric area and minimal tenderness across lower abdomen, no masses noted    "